# Patient Record
(demographics unavailable — no encounter records)

---
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
## 2024-10-18 NOTE — HEALTH RISK ASSESSMENT
[Fair] :  ~his/her~ mood as fair [Intercurrent ED visits] : went to ED [No] : In the past 12 months have you used drugs other than those required for medical reasons? No [No falls in past year] : Patient reported no falls in the past year [0] : 2) Feeling down, depressed, or hopeless: Not at all (0) [Patient reported mammogram was normal] : Patient reported mammogram was normal [Patient reported PAP Smear was normal] : Patient reported PAP Smear was normal [HIV test declined] : HIV test declined [Hepatitis C test declined] : Hepatitis C test declined [# of Members in Household ___] :  household currently consist of [unfilled] member(s) [Employed] : employed [Smoke Detector] : smoke detector [Carbon Monoxide Detector] : carbon monoxide detector [Seat Belt] :  uses seat belt [Sunscreen] : uses sunscreen [Never] : Never [PHQ-2 Negative - No further assessment needed] : PHQ-2 Negative - No further assessment needed [de-identified] : Two weeks ago for neck pain and upper repiratory symptoms, In August visit to [facility] for stomach pain  [de-identified] : Neurologist Dr. Ion chang, GYN Dr. Armstrong  [Audit-CScore] : 0 [de-identified] : Walking, very active  [de-identified] : Regular but no appetite  [PVR3Mynmt] : 0 [Reports changes in hearing] : Reports no changes in hearing [Reports changes in vision] : Reports no changes in vision [Reports changes in dental health] : Reports no changes in dental health [MammogramDate] : 2024 [PapSmearDate] : 10/24 [de-identified] : with her four Kids  [FreeTextEntry3] :

## 2024-10-18 NOTE — ASSESSMENT
[FreeTextEntry1] : The patient is a 41 year old F See detailed assessment above Labs drawn in office. Further management to be completed pending lab results and/or imaging studies. All of the patient's questions and concerns were answered in detail.
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
## 2024-12-15 NOTE — PHYSICAL EXAM
[No Acute Distress] : no acute distress [Normal Sclera/Conjunctiva] : normal sclera/conjunctiva [Normal Oropharynx] : the oropharynx was normal [No JVD] : no jugular venous distention [No Lymphadenopathy] : no lymphadenopathy [No Accessory Muscle Use] : no accessory muscle use [Normal Rate] : normal rate  [No Edema] : there was no peripheral edema [Soft] : abdomen soft [Non Tender] : non-tender [Normal Anterior Cervical Nodes] : no anterior cervical lymphadenopathy [No CVA Tenderness] : no CVA  tenderness [No Spinal Tenderness] : no spinal tenderness [No Joint Swelling] : no joint swelling [No Rash] : no rash [Coordination Grossly Intact] : coordination grossly intact [No Focal Deficits] : no focal deficits [Normal Gait] : normal gait [Speech Grossly Normal] : speech grossly normal [Memory Grossly Normal] : memory grossly normal [Normal Affect] : the affect was normal [Alert and Oriented x3] : oriented to person, place, and time [Normal Mood] : the mood was normal [Normal Insight/Judgement] : insight and judgment were intact [de-identified] : Slight wheezing heard on expiration  [de-identified] : some anxiety noted

## 2024-12-15 NOTE — REVIEW OF SYSTEMS
[Fatigue] : fatigue [Vision Problems] : no vision problems [Postnasal Drip] : postnasal drip [Chest Pain] : no chest pain [Palpitations] : no palpitations [Wheezing] : wheezing [Shortness Of Breath] : no shortness of breath [Cough] : no cough [Dyspnea on Exertion] : no dyspnea on exertion [Abdominal Pain] : no abdominal pain [Nausea] : nausea [Vomiting] : no vomiting [Dysuria] : no dysuria [Hematuria] : no hematuria [Joint Pain] : joint pain [Joint Stiffness] : joint stiffness [Muscle Pain] : muscle pain [Back Pain] : back pain [Skin Rash] : no skin rash [Dizziness] : no dizziness [Fainting] : no fainting [Memory Loss] : no memory loss [Unsteady Walking] : no ataxia [Suicidal] : not suicidal [Insomnia] : no insomnia [Anxiety] : anxiety [Depression] : no depression
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
## 2024-12-15 NOTE — PHYSICAL EXAM
[No Acute Distress] : no acute distress [Normal Sclera/Conjunctiva] : normal sclera/conjunctiva [Normal Oropharynx] : the oropharynx was normal [No JVD] : no jugular venous distention [No Lymphadenopathy] : no lymphadenopathy [No Accessory Muscle Use] : no accessory muscle use [Normal Rate] : normal rate  [No Edema] : there was no peripheral edema [Soft] : abdomen soft [Non Tender] : non-tender [Normal Anterior Cervical Nodes] : no anterior cervical lymphadenopathy [No CVA Tenderness] : no CVA  tenderness [No Spinal Tenderness] : no spinal tenderness [No Joint Swelling] : no joint swelling [No Rash] : no rash [Coordination Grossly Intact] : coordination grossly intact [No Focal Deficits] : no focal deficits [Normal Gait] : normal gait [Speech Grossly Normal] : speech grossly normal [Memory Grossly Normal] : memory grossly normal [Normal Affect] : the affect was normal [Alert and Oriented x3] : oriented to person, place, and time [Normal Mood] : the mood was normal [Normal Insight/Judgement] : insight and judgment were intact [de-identified] : Slight wheezing heard on expiration  [de-identified] : some anxiety noted

## 2024-12-15 NOTE — HEALTH RISK ASSESSMENT
[No] : In the past 12 months have you used drugs other than those required for medical reasons? No [No falls in past year] : Patient reported no falls in the past year [0] : 2) Feeling down, depressed, or hopeless: Not at all (0) [Never] : Never [de-identified] : none [Audit-CScore] : 0 [de-identified] : Pain Management / Neurology [de-identified] : walking [de-identified] : not good [[id number]] : 0

## 2024-12-15 NOTE — PLAN
[FreeTextEntry1] : Excellent weight gain.  Good decrease in bilirubin level Plan: continue present management. Ok to reintroduce breast milk- would suggest continuing to pump and using formula for another day or so before reintroducing BM RTO in 1 week for 2 week well check
[FreeTextEntry1] : 41 yr old F/U Medically Complex  RAD- ? Allergy related?  Importance of Maintenance Inhaler daily Use BID ,,, Use MATHEW if needed  Allergist Referral?   Encounter for Weight Loss- Portion control Lower end Carbs/ Exercise  Renew Ozempic .5mg SC weekly  Spinal Stenosis- F/U Pain management Neurology , weight loss encouraged  Walk as much as possible   Telephone encounter in 1 month In office visit in 2 months  Labs done recently   
[FreeTextEntry1] : 41 yr old F/U Medically Complex  RAD- ? Allergy related?  Importance of Maintenance Inhaler daily Use BID ,,, Use MATHEW if needed  Allergist Referral?   Encounter for Weight Loss- Portion control Lower end Carbs/ Exercise  Renew Ozempic .5mg SC weekly  Spinal Stenosis- F/U Pain management Neurology , weight loss encouraged  Walk as much as possible   Telephone encounter in 1 month In office visit in 2 months  Labs done recently

## 2025-01-14 NOTE — DISCUSSION/SUMMARY
[de-identified] : Fernando Mckeon is a 41-year-old female who presents to the office for evaluation of her bilateral knee pain.  X-rays showed no obvious fractures or dislocations.  Examination showed good bilateral knee range of motion. Discussed with the patient the examination and imaging findings.  Discussed with the patient the management of patient's knee patellofemoral pain at this time, including physical therapy, anti-inflammatories, and injections.  Patient was given referral for physical therapy.  Patient will take over-the-counter anti-inflammatories as needed for pain control. Patient was given bilateral knee braces. Patient will follow-up in 2 months for reevaluation and management.  Patient understanding and in agreement the plan.  All questions answered   Plan: -Physical Therapy -Over-the-counter anti-inflammatories as needed for pain control -Bilateral knee braces given -Follow up in 2 months for reevaluation and management

## 2025-01-14 NOTE — HISTORY OF PRESENT ILLNESS
[de-identified] : Fernando Mckeon is a 41-year-old female who presents to the office for evaluation of her bilateral knee pain.  Patient has been experiencing bilateral knee pain since 1/6/2025.  Pain is located over the anterior and posterior knees.  She has tried Motrin, without relief.  No prior knee pain.  Pain is worse with kneeling and with stairs.  She has not tried physical therapy or injections.  History: Fibromyalgia

## 2025-01-14 NOTE — HISTORY OF PRESENT ILLNESS
[de-identified] : Fernando Mckeon is a 41-year-old female who presents to the office for evaluation of her bilateral knee pain.  Patient has been experiencing bilateral knee pain since 1/6/2025.  Pain is located over the anterior and posterior knees.  She has tried Motrin, without relief.  No prior knee pain.  Pain is worse with kneeling and with stairs.  She has not tried physical therapy or injections.  History: Fibromyalgia

## 2025-01-14 NOTE — DISCUSSION/SUMMARY
[de-identified] : Fernando Mckeon is a 41-year-old female who presents to the office for evaluation of her bilateral knee pain.  X-rays showed no obvious fractures or dislocations.  Examination showed good bilateral knee range of motion. Discussed with the patient the examination and imaging findings.  Discussed with the patient the management of patient's knee patellofemoral pain at this time, including physical therapy, anti-inflammatories, and injections.  Patient was given referral for physical therapy.  Patient will take over-the-counter anti-inflammatories as needed for pain control. Patient was given bilateral knee braces. Patient will follow-up in 2 months for reevaluation and management.  Patient understanding and in agreement the plan.  All questions answered   Plan: -Physical Therapy -Over-the-counter anti-inflammatories as needed for pain control -Bilateral knee braces given -Follow up in 2 months for reevaluation and management

## 2025-01-14 NOTE — PHYSICAL EXAM
[de-identified] : Constitutional:  41 year old female, alert and oriented, cooperative, in no acute distress.  HEENT  NC/AT.  Appearance: symmetric  Chest/Respiratory  Respiratory effort: no intercostal retractions or use of accessory muscles. Nonlabored Breathing  Mental Status:  Judgment, insight: intact Orientation: oriented to time, place, and person  Left Knee  Inspection:     Skin intact, no rashes or lesions     No Effusion     Non-tender to palpation over tibial tubercle, patella, lateral joint line, and pes insertion.    Tenderness over the medial joint line  Range of Motion: 	Extension - 0 degrees 	Flexion - 120 degrees 	Extensor lag: None  Stability:      Demonstrates no Varus or Valgus instability      Negative Anterior or Posterior drawer.      Negative Lachman's  Patella: stable, tracks well.   Right Knee  Inspection:     Skin intact, no rashes or lesions     No Effusion     Non-tender to palpation over tibial tubercle, patella, lateral joint line, and pes insertion.    Tenderness over the medial joint line  Range of Motion: 	Extension - 0 degrees 	Flexion - 120 degrees 	Extensor lag: None  Stability:      Demonstrates no Varus or Valgus instability      Negative Anterior or Posterior drawer.      Negative Lachman's  Patella: stable, tracks well.   Neurologic Exam     Motor intact including 5/5 Extensor Hallucis Longus, 5/5 Flexor Hallucis Longus, 5/5 Tibialis Anterior and 5/5 Gastrocnemius     Sensation Intact to Light Touch including Saphenous, Sural, Superficial Peroneal, Deep Peroneal, Tibial nerve distributions  Vascular Exam     Foot is warm and well perfused with 2+ Dorsalis Pedis Pulse   No pain with range of motion of the bilateral hips. No lumbar paraspinal muscle tenderness. [de-identified] : XRay:  XRays of the Right Knee (3 Views) taken in the office today and discussed with the patient. XRays demonstrate no obvious fracture or dislocation. There is no significant evidence of osteoarthritis or osteophyte formation. (my personal interpretation).  XRay:  XRays of the Left Knee (3 Views) taken in the office today and discussed with the patient. XRays demonstrate no obvious fracture or dislocation. There is no significant evidence of osteoarthritis or osteophyte formation. (my personal interpretation).

## 2025-01-14 NOTE — PHYSICAL EXAM
[de-identified] : Constitutional:  41 year old female, alert and oriented, cooperative, in no acute distress.  HEENT  NC/AT.  Appearance: symmetric  Chest/Respiratory  Respiratory effort: no intercostal retractions or use of accessory muscles. Nonlabored Breathing  Mental Status:  Judgment, insight: intact Orientation: oriented to time, place, and person  Left Knee  Inspection:     Skin intact, no rashes or lesions     No Effusion     Non-tender to palpation over tibial tubercle, patella, lateral joint line, and pes insertion.    Tenderness over the medial joint line  Range of Motion: 	Extension - 0 degrees 	Flexion - 120 degrees 	Extensor lag: None  Stability:      Demonstrates no Varus or Valgus instability      Negative Anterior or Posterior drawer.      Negative Lachman's  Patella: stable, tracks well.   Right Knee  Inspection:     Skin intact, no rashes or lesions     No Effusion     Non-tender to palpation over tibial tubercle, patella, lateral joint line, and pes insertion.    Tenderness over the medial joint line  Range of Motion: 	Extension - 0 degrees 	Flexion - 120 degrees 	Extensor lag: None  Stability:      Demonstrates no Varus or Valgus instability      Negative Anterior or Posterior drawer.      Negative Lachman's  Patella: stable, tracks well.   Neurologic Exam     Motor intact including 5/5 Extensor Hallucis Longus, 5/5 Flexor Hallucis Longus, 5/5 Tibialis Anterior and 5/5 Gastrocnemius     Sensation Intact to Light Touch including Saphenous, Sural, Superficial Peroneal, Deep Peroneal, Tibial nerve distributions  Vascular Exam     Foot is warm and well perfused with 2+ Dorsalis Pedis Pulse   No pain with range of motion of the bilateral hips. No lumbar paraspinal muscle tenderness. [de-identified] : XRay:  XRays of the Right Knee (3 Views) taken in the office today and discussed with the patient. XRays demonstrate no obvious fracture or dislocation. There is no significant evidence of osteoarthritis or osteophyte formation. (my personal interpretation).  XRay:  XRays of the Left Knee (3 Views) taken in the office today and discussed with the patient. XRays demonstrate no obvious fracture or dislocation. There is no significant evidence of osteoarthritis or osteophyte formation. (my personal interpretation).

## 2025-03-07 NOTE — REVIEW OF SYSTEMS
[Abdominal Pain] : no abdominal pain [Nausea] : nausea [Constipation] : no constipation [Diarrhea] : diarrhea [Vomiting] : no vomiting [Heartburn] : no heartburn [Melena] : no melena [Joint Pain] : joint pain [Muscle Pain] : muscle pain [Negative] : Heme/Lymph

## 2025-03-07 NOTE — HEALTH RISK ASSESSMENT
[No] : In the past 12 months have you used drugs other than those required for medical reasons? No [0] : 2) Feeling down, depressed, or hopeless: Not at all (0) [Never] : Never [No falls in past year] : Patient reported no falls in the past year [de-identified] : none [de-identified] : neurology [Audit-CScore] : 0 [de-identified] : walking [de-identified] : regular [[id number]] : 0

## 2025-03-07 NOTE — PHYSICAL EXAM
[No Acute Distress] : no acute distress [Normal] : normal rate, regular rhythm, normal S1 and S2 and no murmur heard [No Edema] : there was no peripheral edema [Soft] : abdomen soft [Non Tender] : non-tender [Normal Anterior Cervical Nodes] : no anterior cervical lymphadenopathy [No CVA Tenderness] : no CVA  tenderness [No Joint Swelling] : no joint swelling [No Rash] : no rash [Coordination Grossly Intact] : coordination grossly intact [Speech Grossly Normal] : speech grossly normal [Normal Mood] : the mood was normal [de-identified] : Obese in stature

## 2025-03-07 NOTE — ASSESSMENT
[FreeTextEntry1] : 41 yr old F/"U  Weight loss Counseling- Discussed again need to go to GI for eval Encouraged higher protein type meals/ Snacks Will increase Wegovy dosage Main S/E Nausea/ Constipation Will check CBC/ Comp   BMI 34%- Some weight loss noted Continue Portion control, low Carbs Exercise / Walking    Labs done in office by MA F/U 3 months Re-assess Sooner if needed

## 2025-03-07 NOTE — HISTORY OF PRESENT ILLNESS
[de-identified] : 41 y rold female presents for above, feels well overall. Ongoing chronic complaints. Still reports she doesnt eat much, nausea, fill up quickly , has not been to GI as yet, recommend she be evaluated. Lost 7 pounds since last visit, asks to go up in dosage Wegovy. Will increase, but explained  the slower the better as far as increasing dosage.  [FreeTextEntry1] : F/U Mult medical conditions

## 2025-06-22 NOTE — HEALTH RISK ASSESSMENT
[No] : In the past 12 months have you used drugs other than those required for medical reasons? No [No falls in past year] : Patient reported no falls in the past year [0] : 2) Feeling down, depressed, or hopeless: Not at all (0) [Never] : Never [Audit-CScore] : 0 [de-identified] : walking [de-identified] : regular [[id number]] : 0

## 2025-06-22 NOTE — HISTORY OF PRESENT ILLNESS
[FreeTextEntry1] : F/U Weight loss Encounter  [de-identified] : 41 yr old presents for above, had episode of back pain which she believed to be UTI, went to [facility], treated with antibiotic, feels better. +Renal stones, Renal cyst,,,

## 2025-06-22 NOTE — HEALTH RISK ASSESSMENT
[No] : In the past 12 months have you used drugs other than those required for medical reasons? No [No falls in past year] : Patient reported no falls in the past year [0] : 2) Feeling down, depressed, or hopeless: Not at all (0) [Never] : Never [Audit-CScore] : 0 [de-identified] : walking [de-identified] : regular [[id number]] : 0

## 2025-06-22 NOTE — REVIEW OF SYSTEMS
[Joint Pain] : joint pain [Muscle Pain] : muscle pain [Back Pain] : back pain [Fever] : no fever [Chills] : no chills [Fatigue] : no fatigue [Vision Problems] : no vision problems [Chest Pain] : no chest pain [Palpitations] : no palpitations [Shortness Of Breath] : no shortness of breath [Wheezing] : no wheezing [Cough] : no cough [Abdominal Pain] : no abdominal pain [Dysuria] : no dysuria [Incontinence] : no incontinence [Hematuria] : no hematuria [Frequency] : no frequency [Skin Rash] : no skin rash [Headache] : no headache [Memory Loss] : no memory loss [Unsteady Walking] : no ataxia [Suicidal] : not suicidal [Insomnia] : no insomnia

## 2025-06-22 NOTE — PLAN
[FreeTextEntry1] : 41 yr old F/U   Weight loss counseling- Continue Wegovy 1.7mg SC weekly, portion control, low carbs, exercise  Enlarged Lymph Nodes- Will repeat Radiology  in approx 3 months ( Will discuss with  Co _ Provider) All Screenings reported UTD Ex; Mammo/ US , GYN H/O Auto Immune / Fibromyalgia   Labs done in office by MA   Labs done in office by MA   F/U 3 months Re-assess    Admission

## 2025-06-22 NOTE — PLAN
[FreeTextEntry1] : 41 yr old F/U   Weight loss counseling- Continue Wegovy 1.7mg SC weekly, portion control, low carbs, exercise  Enlarged Lymph Nodes- Will repeat Radiology  in approx 3 months ( Will discuss with  Co _ Provider) All Screenings reported UTD Ex; Mammo/ US , GYN H/O Auto Immune / Fibromyalgia   Labs done in office by MA   Labs done in office by MA   F/U 3 months Re-assess

## 2025-06-22 NOTE — HEALTH RISK ASSESSMENT
[No] : In the past 12 months have you used drugs other than those required for medical reasons? No [No falls in past year] : Patient reported no falls in the past year [0] : 2) Feeling down, depressed, or hopeless: Not at all (0) [Never] : Never [Audit-CScore] : 0 [de-identified] : walking [de-identified] : regular [[id number]] : 0

## 2025-06-22 NOTE — PHYSICAL EXAM
[No Acute Distress] : no acute distress [Normal Sclera/Conjunctiva] : normal sclera/conjunctiva [No JVD] : no jugular venous distention [Normal] : normal rate, regular rhythm, normal S1 and S2 and no murmur heard [No Edema] : there was no peripheral edema [Normal Anterior Cervical Nodes] : no anterior cervical lymphadenopathy [Soft] : abdomen soft [No CVA Tenderness] : no CVA  tenderness [No Joint Swelling] : no joint swelling [No Rash] : no rash [Coordination Grossly Intact] : coordination grossly intact [Speech Grossly Normal] : speech grossly normal [Normal Mood] : the mood was normal [de-identified] : Obese in stature

## 2025-06-22 NOTE — HISTORY OF PRESENT ILLNESS
[FreeTextEntry1] : F/U Weight loss Encounter  [de-identified] : 41 yr old presents for above, had episode of back pain which she believed to be UTI, went to [facility], treated with antibiotic, feels better. +Renal stones, Renal cyst,,,

## 2025-06-22 NOTE — HISTORY OF PRESENT ILLNESS
[FreeTextEntry1] : F/U Weight loss Encounter  [de-identified] : 41 yr old presents for above, had episode of back pain which she believed to be UTI, went to [facility], treated with antibiotic, feels better. +Renal stones, Renal cyst,,,

## 2025-06-22 NOTE — PHYSICAL EXAM
[No Acute Distress] : no acute distress [Normal Sclera/Conjunctiva] : normal sclera/conjunctiva [No JVD] : no jugular venous distention [Normal] : normal rate, regular rhythm, normal S1 and S2 and no murmur heard [No Edema] : there was no peripheral edema [Soft] : abdomen soft [Normal Anterior Cervical Nodes] : no anterior cervical lymphadenopathy [No CVA Tenderness] : no CVA  tenderness [No Joint Swelling] : no joint swelling [No Rash] : no rash [Coordination Grossly Intact] : coordination grossly intact [Speech Grossly Normal] : speech grossly normal [Normal Mood] : the mood was normal [de-identified] : Obese in stature

## 2025-06-22 NOTE — PHYSICAL EXAM
[No Acute Distress] : no acute distress [Normal Sclera/Conjunctiva] : normal sclera/conjunctiva [No JVD] : no jugular venous distention [Normal] : normal rate, regular rhythm, normal S1 and S2 and no murmur heard [No Edema] : there was no peripheral edema [Normal Anterior Cervical Nodes] : no anterior cervical lymphadenopathy [Soft] : abdomen soft [No CVA Tenderness] : no CVA  tenderness [No Joint Swelling] : no joint swelling [No Rash] : no rash [Coordination Grossly Intact] : coordination grossly intact [Speech Grossly Normal] : speech grossly normal [Normal Mood] : the mood was normal [de-identified] : Obese in stature